# Patient Record
Sex: FEMALE | Race: WHITE | NOT HISPANIC OR LATINO | Employment: UNEMPLOYED | URBAN - METROPOLITAN AREA
[De-identification: names, ages, dates, MRNs, and addresses within clinical notes are randomized per-mention and may not be internally consistent; named-entity substitution may affect disease eponyms.]

---

## 2017-10-04 ENCOUNTER — APPOINTMENT (OUTPATIENT)
Dept: RADIOLOGY | Age: 32
End: 2017-10-04
Attending: PHYSICIAN ASSISTANT
Payer: COMMERCIAL

## 2017-10-04 ENCOUNTER — TRANSCRIBE ORDERS (OUTPATIENT)
Dept: URGENT CARE | Age: 32
End: 2017-10-04

## 2017-10-04 ENCOUNTER — OFFICE VISIT (OUTPATIENT)
Dept: URGENT CARE | Age: 32
End: 2017-10-04
Payer: COMMERCIAL

## 2017-10-04 DIAGNOSIS — R05.9 COUGH: ICD-10-CM

## 2017-10-04 PROCEDURE — 99203 OFFICE O/P NEW LOW 30 MIN: CPT | Performed by: FAMILY MEDICINE

## 2017-10-04 PROCEDURE — 71020 HB CHEST X-RAY 2VW FRONTAL&LATL: CPT

## 2017-10-06 NOTE — PROGRESS NOTES
Assessment  1  URTI (acute upper respiratory infection) (465 9) (J06 9)   2  History of cough   3  Cough (786 2) (R05)    Plan  Cough, URTI (acute upper respiratory infection)    · Benzonatate 200 MG Oral Capsule; TAKE 1 CAPSULE 3 TIMES DAILY AS NEEDED   · Proventil  (90 Base) MCG/ACT Inhalation Aerosol Solution; INHALE 1 TO 2  PUFFS EVERY 6 HOURS AS NEEDED   · Continue with our present treatment plan ; Status:Complete;   Done: [id number]   · Resume activity to your tolerance ; Status:Complete;   Done: 45DTG3880   · Use a cool mist humidifier in the room ; Status:Complete;   Done: 41PLX9779    Discussion/Summary  Discussion Summary:   May restart the Allegra-D when you stop the benzonatate  Medication Side Effects Reviewed: Possible side effects of new medications were reviewed with the patient/guardian today  Understands and agrees with treatment plan: The treatment plan was reviewed with the patient/guardian  The patient/guardian understands and agrees with the treatment plan   Counseling Documentation With Imm: The patient was counseled regarding instructions for management,-prognosis,-patient and family education,-impressions,-risks and benefits of treatment options,-importance of compliance with treatment  Follow Up Instructions: Follow Up with your Primary Care Provider in 7-10 days  If your symptoms worsen, go to the nearest [facility] Emergency Department  Chief Complaint  1  Cough  Chief Complaint Free Text Note Form: Pt  was treated for pneumonia, 2 weeks ago with z rafita and steroids   No chest x ray done  Felt better but cough worsened 1 week ago    Also, has right lower thoracic back pain  History of Present Illness  HPI: Patient diagnosed with walking pneumonia 2 weeks ago and was put on a Z-Rafita, steroids and an albuterol inhaler  She did feel slightly better, but has continued to cough  She did have more wheezing initially, which has subsided some   Patient also with onset of right lower thoracic back pain last couple days   Hospital Based Practices Required Assessment:   Pain Assessment   the patient states they have pain  (on a scale of 0 to 10, the patient rates the pain at 3 )   Abuse And Domestic Violence Screen    Yes, the patient is safe at home -The patient states no one is hurting them  Depression And Suicide Screen  No, the patient has not had thoughts of hurting themself  No, the patient has not felt depressed in the past 7 days  Cough: 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  Associated symptoms include wheezing-and-headache, but-no dyspnea,-no chills,-no fever,-no runny nose,-no stuffy nose,-no sore throat,-no myalgias,-no pleuritic chest pain,-no chest pain,-no vomiting,-no heartburn,-no postnasal drainage-and-no night sweats  Review of Systems  Focused-Female:   Constitutional: as noted in HPI    ENT: as noted in HPI  Respiratory: as noted in HPI  Musculoskeletal: as noted in HPI  Active Problems  1  IBS (irritable bowel syndrome) (564 1) (K58 9)   2  Migraine (346 90) (G43 909)    Past Medical History  1  History of cough   2  History of pneumonia (V12 61) (Z87 01)  Active Problems And Past Medical History Reviewed: The active problems and past medical history were reviewed and updated today  Social History   · Never a smoker   · Social alcohol use (Z78 9)  Social History Reviewed: The social history was reviewed and updated today  Current Meds   1  Albuterol AERS; Therapy: (Recorded:[id number]) to Recorded   2  Xulane 150-35 MCG/24HR Transdermal Patch Weekly; Therapy: (29-29-69-33) to Recorded  Medication List Reviewed: The medication list was reviewed and updated today  Allergies  1  Accutane  2  Gluten   3   Milk    Vitals  Signs   Recorded: 35NFG6306 04:44PM   Temperature: 99 9 F, Temporal  Heart Rate: 85  Respiration: 18  Systolic: 150  Diastolic: 80  Height: 5 ft 4 in  Weight: 150 lb   BMI Calculated: 25 75  BSA Calculated: 1 73  O2 Saturation: 98  LMP: [id number]  Pain Scale: 3    Physical Exam    Constitutional   General appearance: No acute distress, well appearing and well nourished  Eyes   Conjunctiva and lids: No swelling, erythema or discharge  Pupils and irises: Equal, round and reactive to light  Ears, Nose, Mouth, and Throat   External inspection of ears and nose: Normal     Otoscopic examination: Tympanic membranes translucent with normal light reflex  Canals patent without erythema  Nasal mucosa, septum, and turbinates: Normal without edema or erythema  Oropharynx: Normal with no erythema, edema, exudate or lesions  Pulmonary   Respiratory effort: No increased work of breathing or signs of respiratory distress  Auscultation of lungs: Clear to auscultation  Cardiovascular   Auscultation of heart: Normal rate and rhythm, normal S1 and S2, without murmurs  Musculoskeletal Mild tenderness the right thoracic back at the lower rib cage  Psychiatric   Orientation to person, place, and time: Normal     Mood and affect: Normal        Results/Data  Diagnostic Studies Reviewed: I personally reviewed the films/images/results in the office today  My interpretation follows  X-ray Review No acute findings        Signatures   Electronically signed by : Robbie Pacheco, [facility]; Oct  4 2017  5:34PM EST                       (Author)    Electronically signed by : Abi Murdock DO; Oct  5 2017 10:29AM EST                       (Co-author)

## 2018-05-10 ENCOUNTER — HOSPITAL ENCOUNTER (EMERGENCY)
Facility: HOSPITAL | Age: 33
Discharge: HOME | End: 2018-05-10
Attending: EMERGENCY MEDICINE | Admitting: EMERGENCY MEDICINE
Payer: COMMERCIAL

## 2018-05-10 ENCOUNTER — OFFICE VISIT (OUTPATIENT)
Dept: URGENT CARE | Age: 33
End: 2018-05-10
Payer: COMMERCIAL

## 2018-05-10 ENCOUNTER — APPOINTMENT (EMERGENCY)
Dept: CT IMAGING | Facility: HOSPITAL | Age: 33
End: 2018-05-10
Payer: COMMERCIAL

## 2018-05-10 VITALS
DIASTOLIC BLOOD PRESSURE: 86 MMHG | BODY MASS INDEX: 25.71 KG/M2 | SYSTOLIC BLOOD PRESSURE: 135 MMHG | OXYGEN SATURATION: 99 % | TEMPERATURE: 98.7 F | HEIGHT: 64 IN | WEIGHT: 150.6 LBS | HEART RATE: 86 BPM | RESPIRATION RATE: 16 BRPM

## 2018-05-10 VITALS
HEIGHT: 64 IN | TEMPERATURE: 98.1 F | RESPIRATION RATE: 18 BRPM | BODY MASS INDEX: 25.68 KG/M2 | HEART RATE: 84 BPM | DIASTOLIC BLOOD PRESSURE: 90 MMHG | SYSTOLIC BLOOD PRESSURE: 138 MMHG | WEIGHT: 150.4 LBS | OXYGEN SATURATION: 99 %

## 2018-05-10 DIAGNOSIS — R35.0 INCREASED URINARY FREQUENCY: ICD-10-CM

## 2018-05-10 DIAGNOSIS — K21.9 GASTROESOPHAGEAL REFLUX DISEASE WITHOUT ESOPHAGITIS: ICD-10-CM

## 2018-05-10 DIAGNOSIS — R10.84 GENERALIZED ABDOMINAL PAIN: Primary | ICD-10-CM

## 2018-05-10 DIAGNOSIS — R10.84 DIFFUSE ABDOMINAL PAIN: Primary | ICD-10-CM

## 2018-05-10 LAB
ALBUMIN SERPL BCP-MCNC: 3.3 G/DL (ref 3.5–5)
ALP SERPL-CCNC: 68 U/L (ref 46–116)
ALT SERPL W P-5'-P-CCNC: 20 U/L (ref 12–78)
ANION GAP SERPL CALCULATED.3IONS-SCNC: 9 MMOL/L (ref 4–13)
AST SERPL W P-5'-P-CCNC: 15 U/L (ref 5–45)
BASOPHILS # BLD AUTO: 0.01 THOUSANDS/ΜL (ref 0–0.1)
BASOPHILS NFR BLD AUTO: 0 % (ref 0–1)
BILIRUB SERPL-MCNC: 0.4 MG/DL (ref 0.2–1)
BILIRUB UR QL STRIP: NEGATIVE
BUN SERPL-MCNC: 7 MG/DL (ref 5–25)
CALCIUM SERPL-MCNC: 9.8 MG/DL (ref 8.3–10.1)
CHLORIDE SERPL-SCNC: 104 MMOL/L (ref 100–108)
CLARITY UR: CLEAR
CO2 SERPL-SCNC: 26 MMOL/L (ref 21–32)
COLOR UR: YELLOW
CREAT SERPL-MCNC: 0.87 MG/DL (ref 0.6–1.3)
EOSINOPHIL # BLD AUTO: 0.04 THOUSAND/ΜL (ref 0–0.61)
EOSINOPHIL NFR BLD AUTO: 0 % (ref 0–6)
ERYTHROCYTE [DISTWIDTH] IN BLOOD BY AUTOMATED COUNT: 14.8 % (ref 11.6–15.1)
EXT PREG TEST URINE: NEGATIVE
GFR SERPL CREATININE-BSD FRML MDRD: 87 ML/MIN/1.73SQ M
GLUCOSE SERPL-MCNC: 97 MG/DL (ref 65–140)
GLUCOSE UR STRIP-MCNC: NEGATIVE MG/DL
HCT VFR BLD AUTO: 38.8 % (ref 34.8–46.1)
HGB BLD-MCNC: 12.7 G/DL (ref 11.5–15.4)
HGB UR QL STRIP.AUTO: NEGATIVE
KETONES UR STRIP-MCNC: NEGATIVE MG/DL
LEUKOCYTE ESTERASE UR QL STRIP: NEGATIVE
LIPASE SERPL-CCNC: 150 U/L (ref 73–393)
LYMPHOCYTES # BLD AUTO: 3.27 THOUSANDS/ΜL (ref 0.6–4.47)
LYMPHOCYTES NFR BLD AUTO: 24 % (ref 14–44)
MCH RBC QN AUTO: 26.3 PG (ref 26.8–34.3)
MCHC RBC AUTO-ENTMCNC: 32.7 G/DL (ref 31.4–37.4)
MCV RBC AUTO: 81 FL (ref 82–98)
MONOCYTES # BLD AUTO: 0.42 THOUSAND/ΜL (ref 0.17–1.22)
MONOCYTES NFR BLD AUTO: 3 % (ref 4–12)
NEUTROPHILS # BLD AUTO: 10.15 THOUSANDS/ΜL (ref 1.85–7.62)
NEUTS SEG NFR BLD AUTO: 73 % (ref 43–75)
NITRITE UR QL STRIP: NEGATIVE
PH UR STRIP.AUTO: 6 [PH] (ref 4.5–8)
PLATELET # BLD AUTO: 302 THOUSANDS/UL (ref 149–390)
PMV BLD AUTO: 9.8 FL (ref 8.9–12.7)
POTASSIUM SERPL-SCNC: 4 MMOL/L (ref 3.5–5.3)
PROT SERPL-MCNC: 6.9 G/DL (ref 6.4–8.2)
PROT UR STRIP-MCNC: NEGATIVE MG/DL
RBC # BLD AUTO: 4.82 MILLION/UL (ref 3.81–5.12)
SL AMB  POCT GLUCOSE, UA: ABNORMAL
SL AMB LEUKOCYTE ESTERASE,UA: ABNORMAL
SL AMB POCT BILIRUBIN,UA: ABNORMAL
SL AMB POCT BLOOD,UA: ABNORMAL
SL AMB POCT CLARITY,UA: CLEAR
SL AMB POCT COLOR,UA: ABNORMAL
SL AMB POCT KETONES,UA: 15
SL AMB POCT NITRITE,UA: ABNORMAL
SL AMB POCT PH,UA: 6
SL AMB POCT SPECIFIC GRAVITY,UA: 1.02
SL AMB POCT URINE PROTEIN: ABNORMAL
SL AMB POCT UROBILINOGEN: ABNORMAL
SODIUM SERPL-SCNC: 139 MMOL/L (ref 136–145)
SP GR UR STRIP.AUTO: <=1.005 (ref 1–1.03)
UROBILINOGEN UR QL STRIP.AUTO: 0.2 E.U./DL
WBC # BLD AUTO: 13.89 THOUSAND/UL (ref 4.31–10.16)

## 2018-05-10 PROCEDURE — 93005 ELECTROCARDIOGRAM TRACING: CPT

## 2018-05-10 PROCEDURE — 83690 ASSAY OF LIPASE: CPT | Performed by: EMERGENCY MEDICINE

## 2018-05-10 PROCEDURE — 81003 URINALYSIS AUTO W/O SCOPE: CPT | Performed by: EMERGENCY MEDICINE

## 2018-05-10 PROCEDURE — 99213 OFFICE O/P EST LOW 20 MIN: CPT | Performed by: FAMILY MEDICINE

## 2018-05-10 PROCEDURE — 36415 COLL VENOUS BLD VENIPUNCTURE: CPT | Performed by: EMERGENCY MEDICINE

## 2018-05-10 PROCEDURE — 99284 EMERGENCY DEPT VISIT MOD MDM: CPT

## 2018-05-10 PROCEDURE — 80053 COMPREHEN METABOLIC PANEL: CPT | Performed by: EMERGENCY MEDICINE

## 2018-05-10 PROCEDURE — 96374 THER/PROPH/DIAG INJ IV PUSH: CPT

## 2018-05-10 PROCEDURE — 81025 URINE PREGNANCY TEST: CPT | Performed by: EMERGENCY MEDICINE

## 2018-05-10 PROCEDURE — 85025 COMPLETE CBC W/AUTO DIFF WBC: CPT | Performed by: EMERGENCY MEDICINE

## 2018-05-10 PROCEDURE — 96361 HYDRATE IV INFUSION ADD-ON: CPT

## 2018-05-10 PROCEDURE — 74177 CT ABD & PELVIS W/CONTRAST: CPT

## 2018-05-10 RX ORDER — ONDANSETRON 2 MG/ML
4 INJECTION INTRAMUSCULAR; INTRAVENOUS ONCE
Status: COMPLETED | OUTPATIENT
Start: 2018-05-10 | End: 2018-05-10

## 2018-05-10 RX ORDER — MAGNESIUM HYDROXIDE/ALUMINUM HYDROXICE/SIMETHICONE 120; 1200; 1200 MG/30ML; MG/30ML; MG/30ML
30 SUSPENSION ORAL ONCE
Status: COMPLETED | OUTPATIENT
Start: 2018-05-10 | End: 2018-05-10

## 2018-05-10 RX ORDER — ONDANSETRON 4 MG/1
8 TABLET, ORALLY DISINTEGRATING ORAL EVERY 8 HOURS PRN
Qty: 10 TABLET | Refills: 3 | Status: SHIPPED | OUTPATIENT
Start: 2018-05-10 | End: 2020-11-30 | Stop reason: HOSPADM

## 2018-05-10 RX ORDER — NAPROXEN 500 MG/1
250 TABLET ORAL 2 TIMES DAILY PRN
COMMUNITY

## 2018-05-10 RX ORDER — FEXOFENADINE HCL AND PSEUDOEPHEDRINE HCI 180; 240 MG/1; MG/1
1 TABLET, EXTENDED RELEASE ORAL DAILY
COMMUNITY

## 2018-05-10 RX ORDER — MULTIVITAMIN
1 TABLET ORAL DAILY
COMMUNITY

## 2018-05-10 RX ORDER — PHENOL 1.4 %
600 AEROSOL, SPRAY (ML) MUCOUS MEMBRANE DAILY
COMMUNITY
End: 2020-11-30 | Stop reason: HOSPADM

## 2018-05-10 RX ADMIN — ONDANSETRON 4 MG: 2 INJECTION INTRAMUSCULAR; INTRAVENOUS at 19:49

## 2018-05-10 RX ADMIN — ALUMINUM HYDROXIDE, MAGNESIUM HYDROXIDE, AND SIMETHICONE 30 ML: 200; 200; 20 SUSPENSION ORAL at 19:51

## 2018-05-10 RX ADMIN — LIDOCAINE HYDROCHLORIDE 15 ML: 20 SOLUTION ORAL; TOPICAL at 19:51

## 2018-05-10 RX ADMIN — IOHEXOL 100 ML: 350 INJECTION, SOLUTION INTRAVENOUS at 20:41

## 2018-05-10 RX ADMIN — SODIUM CHLORIDE 1000 ML: 0.9 INJECTION, SOLUTION INTRAVENOUS at 19:48

## 2018-05-10 NOTE — ED PROVIDER NOTES
History  Chief Complaint   Patient presents with    Abdominal Pain     patient reports having abdominal pain with radiation to back starting last night  nausea w/o vomiting present  constipation noted-last BM 2-3 days ago  denies fevers, but states chills on tuesday-but not since that time  denies abd sx hx      70-year-old female presents from urgent care for evaluation of abdominal pain  Patient states that she developed abdominal cramping and back pain yesterday evening  She has been having a dull sensation between both scapulas that intensifies when she develops abdominal cramping  Patient was directed to the emergency department for an abdominal pain workup  She states that she has a history of constipation  She has not had a bowel movement in the past 4 days  This is not atypical for her  She has had poor appetite and since arriving to the emergency department she has developed sharp stabbing pain that radiates into her chest   Patient has had similar pain in the past with her acid reflux  This pain is different than the pain she has been experiencing in her lower abdomen described as cramping  Pain does worsen with eating and she has had poor appetite today  Patient does admit to feeling anxious  She has no dysuria or hematuria  She has noticed some urinary frequency  No black or bloody bowel movements  She is recently been treating cold symptoms with over-the-counter medications  She denies fevers          History provided by:  Patient, relative and medical records   used: No    Abdominal Pain   Pain location:  Epigastric and periumbilical  Pain quality: burning and cramping    Pain radiates to:  Back  Pain severity:  Severe  Onset quality:  Gradual  Duration:  2 days  Timing:  Constant  Progression:  Worsening  Chronicity:  New  Context: eating    Context: not alcohol use, not diet changes, not previous surgeries and not trauma    Relieved by:  Nothing  Worsened by: Eating  Ineffective treatments:  None tried  Associated symptoms: constipation, cough, nausea and vomiting    Associated symptoms: no belching, no chest pain, no dysuria, no fever, no hematemesis and no hematochezia        Prior to Admission Medications   Prescriptions Last Dose Informant Patient Reported? Taking? FROVATRIPTAN SUCCINATE PO   Yes No   Sig: Take by mouth   Multiple Vitamin (MULTIVITAMIN) tablet   Yes No   Sig: Take 1 tablet by mouth daily   Zinc Acetate, Oral, (ZINC ACETATE PO)   Yes No   Sig: Take by mouth   calcium carbonate (OS-VIRAJ) 600 MG tablet   Yes No   Sig: Take 600 mg by mouth daily   fexofenadine-pseudoephedrine (ALLEGRA-D 24) 180-240 MG per 24 hr tablet   Yes No   Sig: Take 1 tablet by mouth daily   naproxen (NAPROSYN) 500 mg tablet   Yes No   Sig: Take 250 mg by mouth 2 (two) times a day as needed for mild pain   norelgestromin-ethinyl estradiol Laquetta Froilans) 150-35 MCG/24HR   Yes No   Sig: Place on the skin      Facility-Administered Medications: None       Past Medical History:   Diagnosis Date    Allergic rhinitis     Migraine        History reviewed  No pertinent surgical history  History reviewed  No pertinent family history  I have reviewed and agree with the history as documented  Social History   Substance Use Topics    Smoking status: Never Smoker    Smokeless tobacco: Never Used    Alcohol use No        Review of Systems   Constitutional: Positive for appetite change  Negative for fever  HENT: Positive for congestion  Respiratory: Positive for cough  Cardiovascular: Positive for palpitations  Negative for chest pain and leg swelling  Gastrointestinal: Positive for abdominal pain, constipation, nausea and vomiting  Negative for hematemesis and hematochezia  Rectal pain: Dry heaving  Genitourinary: Negative for dysuria and pelvic pain  Musculoskeletal: Positive for back pain  Neurological: Negative for dizziness and light-headedness     All other systems reviewed and are negative  Physical Exam  ED Triage Vitals [05/10/18 1802]   Temperature Pulse Respirations Blood Pressure SpO2   98 7 °F (37 1 °C) (!) 110 16 (!) 180/100 100 %      Temp Source Heart Rate Source Patient Position - Orthostatic VS BP Location FiO2 (%)   Oral Monitor Sitting Left arm --      Pain Score       5           Orthostatic Vital Signs  Vitals:    05/10/18 1802 05/10/18 1947 05/10/18 2215   BP: (!) 180/100 148/86 135/86   Pulse: (!) 110 77 86   Patient Position - Orthostatic VS: Sitting Lying Lying       Physical Exam   Constitutional: She is oriented to person, place, and time  She appears well-developed and well-nourished  She appears distressed  HENT:   Head: Normocephalic  Nose: Nose normal    Mouth/Throat: Mucous membranes are normal  Posterior oropharyngeal erythema present  No oropharyngeal exudate  No tonsillar exudate  Eyes: Conjunctivae and EOM are normal  Pupils are equal, round, and reactive to light  Neck: Normal range of motion  Neck supple  Cardiovascular: Regular rhythm, normal heart sounds and intact distal pulses  Tachycardia present  Pulmonary/Chest: Effort normal and breath sounds normal    Abdominal: Soft  Bowel sounds are normal  She exhibits no distension  There is tenderness in the right lower quadrant, suprapubic area and left lower quadrant  There is rebound  There is no rigidity, no guarding and no CVA tenderness  No hernia  Musculoskeletal: Normal range of motion  She exhibits no edema, tenderness or deformity  Lymphadenopathy:     She has no cervical adenopathy  Neurological: She is alert and oriented to person, place, and time  She has normal strength and normal reflexes  No cranial nerve deficit or sensory deficit  She exhibits normal muscle tone  Coordination and gait normal    Skin: Skin is warm, dry and intact  No rash noted  Psychiatric: Her behavior is normal  Judgment and thought content normal  Her mood appears anxious  Nursing note and vitals reviewed  ED Medications  Medications   sodium chloride 0 9 % bolus 1,000 mL (0 mL Intravenous Stopped 5/10/18 2048)   lidocaine viscous (XYLOCAINE) 2 % mucosal solution 15 mL (15 mL Swish & Spit Given 5/10/18 1951)   aluminum-magnesium hydroxide-simethicone (MYLANTA) 200-200-20 mg/5 mL oral suspension 30 mL (30 mL Oral Given 5/10/18 1951)   ondansetron (ZOFRAN) injection 4 mg (4 mg Intravenous Given 5/10/18 1949)   iohexol (OMNIPAQUE) 350 MG/ML injection (MULTI-DOSE) 100 mL (100 mL Intravenous Given 5/10/18 2041)       Diagnostic Studies  Results Reviewed     Procedure Component Value Units Date/Time    Comprehensive metabolic panel [[id number]]  (Abnormal) Collected:  05/10/18 1947    Lab Status:  Final result Specimen:  Blood from Arm, Right Updated:  05/10/18 2028     Sodium 139 mmol/L      Potassium 4 0 mmol/L      Chloride 104 mmol/L      CO2 26 mmol/L      Anion Gap 9 mmol/L      BUN 7 mg/dL      Creatinine 0 87 mg/dL      Glucose 97 mg/dL      Calcium 9 8 mg/dL      AST 15 U/L      ALT 20 U/L      Alkaline Phosphatase 68 U/L      Total Protein 6 9 g/dL      Albumin 3 3 (L) g/dL      Total Bilirubin 0 40 mg/dL      eGFR 87 ml/min/1 73sq m     Narrative:         National Kidney Disease Education Program recommendations are as follows:  GFR calculation is accurate only with a steady state creatinine  Chronic Kidney disease less than 60 ml/min/1 73 sq  meters  Kidney failure less than 15 ml/min/1 73 sq  meters      Lipase [[id number]]  (Normal) Collected:  05/10/18 1947    Lab Status:  Final result Specimen:  Blood from Arm, Right Updated:  05/10/18 2028     Lipase 150 u/L     POCT pregnancy, urine [[id number]]  (Normal) Resulted:  05/10/18 1945    Lab Status:  Final result Updated:  05/10/18 2008     EXT PREG TEST UR (Ref: Negative) Negative    CBC and differential [[id number]]  (Abnormal) Collected:  05/10/18 1947    Lab Status:  Final result Specimen:  Blood from Arm, Right Updated: 05/10/18 2006     WBC 13 89 (H) Thousand/uL      RBC 4 82 Million/uL      Hemoglobin 12 7 g/dL      Hematocrit 38 8 %      MCV 81 (L) fL      MCH 26 3 (L) pg      MCHC 32 7 g/dL      RDW 14 8 %      MPV 9 8 fL      Platelets 664 Thousands/uL      Neutrophils Relative 73 %      Lymphocytes Relative 24 %      Monocytes Relative 3 (L) %      Eosinophils Relative 0 %      Basophils Relative 0 %      Neutrophils Absolute 10 15 (H) Thousands/µL      Lymphocytes Absolute 3 27 Thousands/µL      Monocytes Absolute 0 42 Thousand/µL      Eosinophils Absolute 0 04 Thousand/µL      Basophils Absolute 0 01 Thousands/µL     UA w Reflex to Microscopic w Reflex to Culture [[id number]] Collected:  05/10/18 1944    Lab Status:  Final result Specimen:  Urine from Urine, Clean Catch Updated:  05/10/18 1955     Color, UA Yellow     Clarity, UA Clear     Specific Gravity, UA <=1 005     pH, UA 6 0     Leukocytes, UA Negative     Nitrite, UA Negative     Protein, UA Negative mg/dl      Glucose, UA Negative mg/dl      Ketones, UA Negative mg/dl      Urobilinogen, UA 0 2 E U /dl      Bilirubin, UA Negative     Blood, UA Negative                 CT abdomen pelvis with contrast   Final Result by Mike Oquendo MD (05/10 2052)      1  Findings consistent with gastritis, primarily involving the gastric antrum  2   Otherwise normal CT abdomen and pelvis  Specifically, normal appendix              Workstation performed: [id number]                    Procedures  ECG 12 Lead Documentation  Date/Time: 5/10/2018 7:44 PM  Performed by: Andrey Merlin  Authorized by: MARVEL VENTURA     Indications / Diagnosis:  Epigastric pain  ECG reviewed by me, the ED Provider: yes    Patient location:  ED  Previous ECG:     Previous ECG:  Unavailable  Interpretation:     Interpretation: normal    Rate:     ECG rate:  67    ECG rate assessment: normal    Rhythm:     Rhythm: sinus rhythm    Ectopy:     Ectopy: none    QRS:     QRS axis:  Normal  Conduction: Conduction: normal    ST segments:     ST segments:  Normal  T waves:     T waves: normal             Phone Contacts  ED Phone Contact    ED Course                               MDM  Number of Diagnoses or Management Options  Generalized abdominal pain: new and requires workup     Amount and/or Complexity of Data Reviewed  Clinical lab tests: ordered and reviewed  Tests in the radiology section of CPT®: ordered and reviewed  Decide to obtain previous medical records or to obtain history from someone other than the patient: yes    Risk of Complications, Morbidity, and/or Mortality  General comments: Pt  Discharged by Dr Hiral Heller    Patient Progress  Patient progress: stable    CritCare Time    Disposition  Final diagnoses:   Generalized abdominal pain     Time reflects when diagnosis was documented in both MDM as applicable and the Disposition within this note     Time User Action Codes Description Comment    5/10/2018 10:30 PM Dasha Fleming Add [R10 84] Generalized abdominal pain       ED Disposition     ED Disposition Condition Comment    Discharge  Naz Bracklupe discharge to home/self care      Condition at discharge: Good        Follow-up Information    None       Discharge Medication List as of 5/10/2018 10:35 PM      START taking these medications    Details   ondansetron (ZOFRAN ODT) 4 mg disintegrating tablet Take 2 tablets (8 mg total) by mouth every 8 (eight) hours as needed for nausea or vomiting for up to 10 doses, Starting Thu 5/10/2018, Print         CONTINUE these medications which have NOT CHANGED    Details   calcium carbonate (OS-VIRAJ) 600 MG tablet Take 600 mg by mouth daily, Historical Med      fexofenadine-pseudoephedrine (ALLEGRA-D 24) 180-240 MG per 24 hr tablet Take 1 tablet by mouth daily, Historical Med      FROVATRIPTAN SUCCINATE PO Take by mouth, Historical Med      Multiple Vitamin (MULTIVITAMIN) tablet Take 1 tablet by mouth daily, Historical Med      naproxen (NAPROSYN) 500 mg tablet Take 250 mg by mouth 2 (two) times a day as needed for mild pain, Historical Med      norelgestromin-ethinyl estradiol Ozell Sour) 150-35 MCG/24HR Place on the skin, Historical Med      Zinc Acetate, Oral, (ZINC ACETATE PO) Take by mouth, Historical Med           No discharge procedures on file      ED Provider  Electronically Signed by           Perfecto Underwood DO  05/12/18 5304

## 2018-05-10 NOTE — PROGRESS NOTES
[address] [facility]    NAME: Santosh Jeong is a 26 y o  female  : 1985    MRN: [id number]  DATE: May 10, 2018  TIME: 5:53 PM    Assessment and Plan   Diffuse abdominal pain [R10 84]  1  Diffuse abdominal pain     2  Gastroesophageal reflux disease without esophagitis     3  Increased urinary frequency  POCT urine dip     Patient Instructions     Patient advised to go to the ER for further evaluation given sx and exam  All questions answered  Precautions given  PT provided with directions to [facility]  Call made to transfer center notifying of pt movement  Chief Complaint     Chief Complaint   Patient presents with    Abdominal Pain     Since yesterday - diffuse abdominal cramping with nausea and belching along with upper to mid back pain  Admits to increased urinary frequency but no dysuria  Taking TUMS    Back Pain     History of Present Illness   39 y/o female presenting with complaint of diffuse cramping abdominal pain starting yesterday evening  Pain is associated with a mid scapular aching that increases in severity with cramping severity  Symptoms associated with nausea, dry heaving, and increased urinary frequency  Pain is worse after eating with intermittent burning sensation radiating into her lower esophagus  She denies F/C/vomiting or diarrhea  She does have a history of celiac's ds , lactose intolerance, chronic constipation--last bowel movement this a m, and GERD, relieved with OTC therapies  Has had multiple colonoscopies with no significant findings  No previous abdominal surgeries  Prior to onset of abdominal pain pt reports a 7 day hx of cold like sx including b/lear pressure, nasal congestion, sinus pressure, and cough that was intitially productive now harsh and dry  Sore throat now resolved  She has treated this week with mucinex, nyquil, and alkaseltzer tablets  Review of Systems   Review of Systems   Constitutional: Negative for chills and fever     Respiratory: Negative for shortness of breath and wheezing  Cardiovascular: Negative for chest pain and palpitations  Gastrointestinal: Negative for blood in stool, diarrhea and vomiting  Genitourinary: Positive for decreased urine volume  Negative for dysuria, flank pain and urgency  Current Medications       Current Outpatient Prescriptions:     calcium carbonate (OS-VIRAJ) 600 MG tablet, Take 600 mg by mouth daily, Disp: , Rfl:     fexofenadine-pseudoephedrine (ALLEGRA-D 24) 180-240 MG per 24 hr tablet, Take 1 tablet by mouth daily, Disp: , Rfl:     FROVATRIPTAN SUCCINATE PO, Take by mouth, Disp: , Rfl:     Multiple Vitamin (MULTIVITAMIN) tablet, Take 1 tablet by mouth daily, Disp: , Rfl:     naproxen (NAPROSYN) 500 mg tablet, Take 250 mg by mouth 2 (two) times a day as needed for mild pain, Disp: , Rfl:     norelgestromin-ethinyl estradiol (Marcell Cisco) 150-35 MCG/24HR, Place on the skin, Disp: , Rfl:     Zinc Acetate, Oral, (ZINC ACETATE PO), Take by mouth, Disp: , Rfl:     Current Allergies     Allergies as of 05/10/2018 - Reviewed 05/10/2018   Allergen Reaction Noted    Gluten meal Abdominal Pain 10/04/2017    Lac bovis Abdominal Pain 10/04/2017    Isotretinoin Other (See Comments) 10/04/2017            The following portions of the patient's history were reviewed and updated as appropriate: allergies, current medications, past family history, past medical history, past social history, past surgical history and problem list      Past Medical History:   Diagnosis Date    Allergic rhinitis     Migraine        History reviewed  No pertinent surgical history  No family history on file  Medications have been verified      Objective   /90 (BP Location: Right arm, Patient Position: Sitting, Cuff Size: Standard)   Pulse 84   Temp 98 1 °F (36 7 °C) (Oral)   Resp 18   Ht 5' 4" (1 626 m)   Wt 68 2 kg (150 lb 6 4 oz)   LMP 04/09/2018 (Approximate)   SpO2 99%   BMI 25 82 kg/m²      Urine dipstick shows positive for protein and positive for ketones  Physical Exam     Physical Exam   Constitutional: She is oriented to person, place, and time  She appears well-developed and well-nourished  No distress  HENT:   Head: Normocephalic and atraumatic  Right Ear: Hearing, tympanic membrane, external ear and ear canal normal    Left Ear: Hearing, tympanic membrane, external ear and ear canal normal    Nose: Nose normal  No mucosal edema or rhinorrhea  Mouth/Throat: Uvula is midline  Posterior oropharyngeal erythema (mild) present  No oropharyngeal exudate or posterior oropharyngeal edema  Erythematous nasal mucosa  Eyes: Conjunctivae are normal  Right eye exhibits no discharge  Left eye exhibits no discharge  No scleral icterus  Cardiovascular: Normal rate, regular rhythm and normal heart sounds  Pulmonary/Chest: Effort normal and breath sounds normal  No respiratory distress  She has no decreased breath sounds  She has no wheezes  She has no rhonchi  She has no rales  Abdominal: Soft  Bowel sounds are normal  She exhibits no distension  There is no hepatosplenomegaly  There is tenderness in the right upper quadrant  There is positive Calvin's sign  There is no rigidity and no guarding  Neurological: She is alert and oriented to person, place, and time  She has normal reflexes  No cranial nerve deficit  She exhibits normal muscle tone  Coordination normal    Skin: Skin is warm and dry  No rash noted  She is not diaphoretic  Psychiatric: She has a normal mood and affect  Her behavior is normal    Nursing note and vitals reviewed

## 2018-05-11 NOTE — DISCHARGE INSTRUCTIONS
DIAGNOSIS; ABDOMINAL PAIN     - ACTIVITY /DIET AS TOLERATED    - FOR PAIN- OVER THE COUNTER TYLENOL 500 MG EVERY 4 HRS     - FOR ANY NAUSEA/ VOMITING: ZOFRAN 2 TABLETS EVERY 6-8 HRS AS NEEDED     - THE CT SCAN  SHOWED POSSIBLE GASTRITIS- INFLAMMATION OF STOMACH --  I DO NOT KNOW IF THIS IS CAUSING YOUR PAIN TODAY - CAN TRY OVER THE COUNTER PRILOSEC DAILY- WILL TAKE SEVERAL DAYS TO KICK IN     - PLEASE CALL YOUR PRIMARY DOCTOR TOMORROW - TO SCHEDULE AN APPOINTMENT FOR A RECHECK WITHIN 1 WEEK     - PLEASE RETURN TO  THE ER FOR ANY FEVERS- TEMP > 100 4/ ANY PERSISTENT VOMITING/ ANY COFFEE GROUND/ BLOODY VOMITUS/ ANY WORSENING ABDOMINAL PAIN / ANY BLOODY/ BLACK TARRY STOOLS OR ANY NEW / WORSENING/CONCERNING SYMPTOMS TO YOU

## 2018-05-12 LAB
ATRIAL RATE: 70 BPM
P AXIS: 33 DEGREES
PR INTERVAL: 110 MS
QRS AXIS: 60 DEGREES
QRSD INTERVAL: 84 MS
QT INTERVAL: 394 MS
QTC INTERVAL: 416 MS
T WAVE AXIS: 48 DEGREES
VENTRICULAR RATE: 67 BPM

## 2018-05-12 PROCEDURE — 93010 ELECTROCARDIOGRAM REPORT: CPT | Performed by: INTERNAL MEDICINE

## 2020-03-03 NOTE — ED NOTES
Problem: Safety  Goal: Will remain free from falls  Outcome: PROGRESSING SLOWER THAN EXPECTED  Intervention: Implement fall precautions  Note: Pt remains impulsive and confused tonight. Bed alarm, hourly rounding and camera monitored room closed to the nurses station for safety precautions. Will continue to monitor.     Problem: Venous Thromboembolism (VTW)/Deep Vein Thrombosis (DVT) Prevention:  Goal: Patient will participate in Venous Thrombosis (VTE)/Deep Vein Thrombosis (DVT)Prevention Measures  Outcome: PROGRESSING AS EXPECTED  Note: Pt receiving lovenox injection q bedtime for DVT prophylaxis.      Pt returned from [address], [place]  05/10/18 2041

## 2020-11-30 ENCOUNTER — OFFICE VISIT (OUTPATIENT)
Dept: OBGYN CLINIC | Facility: CLINIC | Age: 35
End: 2020-11-30
Payer: COMMERCIAL

## 2020-11-30 VITALS
TEMPERATURE: 98.1 F | SYSTOLIC BLOOD PRESSURE: 118 MMHG | BODY MASS INDEX: 24.92 KG/M2 | DIASTOLIC BLOOD PRESSURE: 80 MMHG | WEIGHT: 146 LBS | HEIGHT: 64 IN

## 2020-11-30 DIAGNOSIS — Z01.419 WELL WOMAN EXAM: Primary | ICD-10-CM

## 2020-11-30 PROBLEM — G43.909 MIGRAINE: Status: ACTIVE | Noted: 2017-10-04

## 2020-11-30 PROBLEM — K58.9 IBS (IRRITABLE BOWEL SYNDROME): Status: ACTIVE | Noted: 2017-10-04

## 2020-11-30 PROCEDURE — G0145 SCR C/V CYTO,THINLAYER,RESCR: HCPCS | Performed by: PHYSICIAN ASSISTANT

## 2020-11-30 PROCEDURE — 87624 HPV HI-RISK TYP POOLED RSLT: CPT | Performed by: PHYSICIAN ASSISTANT

## 2020-11-30 PROCEDURE — 99385 PREV VISIT NEW AGE 18-39: CPT | Performed by: PHYSICIAN ASSISTANT

## 2020-11-30 RX ORDER — UBROGEPANT 100 MG/1
TABLET ORAL
COMMUNITY
Start: 2020-11-10

## 2020-11-30 RX ORDER — ERENUMAB-AOOE 140 MG/ML
INJECTION, SOLUTION SUBCUTANEOUS
COMMUNITY
Start: 2020-11-24

## 2020-12-05 LAB
HPV HR 12 DNA CVX QL NAA+PROBE: NEGATIVE
HPV16 DNA CVX QL NAA+PROBE: NEGATIVE
HPV18 DNA CVX QL NAA+PROBE: NEGATIVE

## 2020-12-09 LAB
LAB AP GYN PRIMARY INTERPRETATION: NORMAL
Lab: NORMAL

## 2021-04-13 DIAGNOSIS — Z23 ENCOUNTER FOR IMMUNIZATION: ICD-10-CM
